# Patient Record
Sex: MALE | Race: OTHER | URBAN - METROPOLITAN AREA
[De-identification: names, ages, dates, MRNs, and addresses within clinical notes are randomized per-mention and may not be internally consistent; named-entity substitution may affect disease eponyms.]

---

## 2017-02-07 ENCOUNTER — APPOINTMENT (RX ONLY)
Dept: URBAN - METROPOLITAN AREA CLINIC 169 | Facility: CLINIC | Age: 27
Setting detail: DERMATOLOGY
End: 2017-02-07

## 2017-02-07 DIAGNOSIS — L29.1 PRURITUS SCROTI: ICD-10-CM

## 2017-02-07 DIAGNOSIS — A63.0 ANOGENITAL (VENEREAL) WARTS: ICD-10-CM

## 2017-02-07 PROCEDURE — ? PRESCRIPTION

## 2017-02-07 PROCEDURE — ? PODOPHYLLIN

## 2017-02-07 PROCEDURE — 17110 DESTRUCTION B9 LES UP TO 14: CPT

## 2017-02-07 PROCEDURE — ? LIQUID NITROGEN

## 2017-02-07 PROCEDURE — ? COUNSELING

## 2017-02-07 PROCEDURE — 54056 CRYOSURGERY PENIS LESION(S): CPT

## 2017-02-07 PROCEDURE — ? TREATMENT REGIMEN

## 2017-02-07 PROCEDURE — 99202 OFFICE O/P NEW SF 15 MIN: CPT | Mod: 25

## 2017-02-07 RX ORDER — IMIQUIMOD 50 MG/G
1 CREAM TOPICAL TIW
Qty: 1 | Refills: 0 | Status: ERX | COMMUNITY
Start: 2017-02-07

## 2017-02-07 RX ADMIN — IMIQUIMOD 1: 50 CREAM TOPICAL at 21:03

## 2017-02-07 ASSESSMENT — LOCATION SIMPLE DESCRIPTION DERM
LOCATION SIMPLE: PENIS
LOCATION SIMPLE: SCROTUM

## 2017-02-07 ASSESSMENT — LOCATION ZONE DERM
LOCATION ZONE: PENIS
LOCATION ZONE: GENITALIA

## 2017-02-07 ASSESSMENT — TOTAL NUMBER OF CONDYLOMA: # OF LESIONS?: 20

## 2017-02-07 ASSESSMENT — LOCATION DETAILED DESCRIPTION DERM
LOCATION DETAILED: RIGHT ANTERIOR SCROTUM
LOCATION DETAILED: DORSAL CORONAL SULCUS
LOCATION DETAILED: LEFT ANTERIOR SCROTUM
LOCATION DETAILED: DORSAL CORONA OF GLANS
LOCATION DETAILED: DORSAL GLANS

## 2017-02-07 NOTE — PROCEDURE: TREATMENT REGIMEN
Initiate Treatment: Aldara Apply three times a week at night to warts x 16 weeks. Wash off in am.
Detail Level: Zone
Initiate Treatment: OTC hydrocortisone 1% cream up to BID as needed for itching
Plan: Follow up in 4 weeks for re-evaluation and re-treatment.  Understands multiple treatments are often necessary. \\n\\n[facility] ST[facility] brochure provided

## 2017-02-07 NOTE — PROCEDURE: LIQUID NITROGEN
Medical Necessity Clause: This procedure was medically necessary because the lesions that were treated were:
Consent: The patient's verbal consent was obtained including but not limited to risks of crusting, scabbing, blistering, scarring, darker or lighter pigmentary change, recurrence, incomplete removal and infection.
Include Z78.9 (Other Specified Conditions Influencing Health Status) As An Associated Diagnosis?: No
Post-Care Instructions: I reviewed with the patient in detail post-care instructions. Patient is to wear sunprotection, and avoid picking at any of the treated lesions. Pt may apply Vaseline to crusted or scabbing areas.
Detail Level: Detailed
Medical Necessity Information: It is in your best interest to select a reason for this procedure from the list below. All of these items fulfill various CMS LCD requirements except the new and changing color options.
Number Of Freeze-Thaw Cycles: 2 freeze-thaw cycles
Render Post-Care Instructions In Note?: yes
Duration Of Freeze Thaw-Cycle (Seconds): 10

## 2017-02-07 NOTE — PROCEDURE: PODOPHYLLIN
Detail Level: Detailed
Post-Care Instructions: I reviewed with the patient in detail post-care instructions. The patient understands that the treated areas should be washed off 6 to 8 hours after application.
Consent: The patient's consent was obtained including but not limited to risks of crusting, scabbing, scarring, blistering, darker or lighter pigmentary change, recurrence, incomplete removal and infection.
Strength: 25% podophyllin
When To Wash Off: 1 hour

## 2017-03-13 ENCOUNTER — APPOINTMENT (RX ONLY)
Dept: URBAN - METROPOLITAN AREA CLINIC 169 | Facility: CLINIC | Age: 27
Setting detail: DERMATOLOGY
End: 2017-03-13

## 2017-03-13 DIAGNOSIS — A63.0 ANOGENITAL (VENEREAL) WARTS: ICD-10-CM

## 2017-03-13 PROCEDURE — ? PODOPHYLLIN

## 2017-03-13 PROCEDURE — ? COUNSELING

## 2017-03-13 PROCEDURE — ? TREATMENT REGIMEN

## 2017-03-13 PROCEDURE — 54056 CRYOSURGERY PENIS LESION(S): CPT

## 2017-03-13 ASSESSMENT — LOCATION DETAILED DESCRIPTION DERM
LOCATION DETAILED: DORSAL GLANS
LOCATION DETAILED: LEFT ANTERIOR SCROTUM
LOCATION DETAILED: DORSAL CORONAL SULCUS
LOCATION DETAILED: RIGHT ANTERIOR SCROTUM

## 2017-03-13 ASSESSMENT — LOCATION ZONE DERM
LOCATION ZONE: PENIS
LOCATION ZONE: GENITALIA

## 2017-03-13 ASSESSMENT — LOCATION SIMPLE DESCRIPTION DERM
LOCATION SIMPLE: SCROTUM
LOCATION SIMPLE: PENIS

## 2017-03-13 NOTE — PROCEDURE: TREATMENT REGIMEN
Detail Level: Zone
Initiate Treatment: Aldara Apply three times a week at night to warts x 16 weeks. Wash off in am.
Plan: Follow up in 4 weeks for re-evaluation and re-treatment.  Understands multiple treatments are often necessary. \\n\\nAdvised patient may start Aldara when lesions heal from today's treatment.

## 2017-03-13 NOTE — PROCEDURE: PODOPHYLLIN
Strength: 25% podophyllin
When To Wash Off: 1 hour
Consent: The patient's consent was obtained including but not limited to risks of crusting, scabbing, scarring, blistering, darker or lighter pigmentary change, recurrence, incomplete removal and infection.
Post-Care Instructions: I reviewed with the patient in detail post-care instructions. The patient understands that the treated areas should be washed off 6 to 8 hours after application.
Detail Level: Detailed

## 2017-04-10 ENCOUNTER — APPOINTMENT (RX ONLY)
Dept: URBAN - METROPOLITAN AREA CLINIC 169 | Facility: CLINIC | Age: 27
Setting detail: DERMATOLOGY
End: 2017-04-10

## 2017-04-10 DIAGNOSIS — A63.0 ANOGENITAL (VENEREAL) WARTS: ICD-10-CM

## 2017-04-10 PROBLEM — L85.3 XEROSIS CUTIS: Status: ACTIVE | Noted: 2017-04-10

## 2017-04-10 PROCEDURE — ? COUNSELING

## 2017-04-10 PROCEDURE — ? PODOPHYLLIN

## 2017-04-10 PROCEDURE — 54056 CRYOSURGERY PENIS LESION(S): CPT

## 2017-04-10 PROCEDURE — ? TREATMENT REGIMEN

## 2017-04-10 ASSESSMENT — LOCATION ZONE DERM
LOCATION ZONE: GENITALIA
LOCATION ZONE: PENIS

## 2017-04-10 ASSESSMENT — LOCATION DETAILED DESCRIPTION DERM
LOCATION DETAILED: DORSAL GLANS
LOCATION DETAILED: DORSAL CORONAL SULCUS
LOCATION DETAILED: LEFT ANTERIOR SCROTUM
LOCATION DETAILED: RIGHT ANTERIOR SCROTUM

## 2017-04-10 ASSESSMENT — LOCATION SIMPLE DESCRIPTION DERM
LOCATION SIMPLE: SCROTUM
LOCATION SIMPLE: PENIS

## 2017-04-10 NOTE — PROCEDURE: TREATMENT REGIMEN
Initiate Treatment: Aldara Apply three times a week at night to warts x 16 weeks. Wash off in am.
Plan: Follow up in 4 weeks for re-evaluation and re-treatment.  Understands multiple treatments are often necessary. \\n\\nAdvised patient may start Aldara when lesions heal from today's treatment.
Detail Level: Zone

## 2017-04-10 NOTE — PROCEDURE: PODOPHYLLIN
Detail Level: Detailed
Post-Care Instructions: I reviewed with the patient in detail post-care instructions. The patient understands that the treated areas should be washed off 6 to 8 hours after application.
Strength: 25% podophyllin
When To Wash Off: 1 hour
Consent: The patient's consent was obtained including but not limited to risks of crusting, scabbing, scarring, blistering, darker or lighter pigmentary change, recurrence, incomplete removal and infection.

## 2023-11-02 NOTE — HPI: ITCHING (SCROTAL PRURITUS)
How Severe Is It?: moderate
Is This A New Presentation, Or A Follow-Up?: Itching
Alert and oriented, no focal deficits, no motor or sensory deficits.